# Patient Record
Sex: FEMALE | URBAN - METROPOLITAN AREA
[De-identification: names, ages, dates, MRNs, and addresses within clinical notes are randomized per-mention and may not be internally consistent; named-entity substitution may affect disease eponyms.]

---

## 2019-01-17 ENCOUNTER — APPOINTMENT (RX ONLY)
Dept: URBAN - METROPOLITAN AREA CLINIC 4 | Facility: CLINIC | Age: 69
Setting detail: DERMATOLOGY
End: 2019-01-17

## 2019-01-17 DIAGNOSIS — L82.1 OTHER SEBORRHEIC KERATOSIS: ICD-10-CM

## 2019-01-17 DIAGNOSIS — L98.8 OTHER SPECIFIED DISORDERS OF THE SKIN AND SUBCUTANEOUS TISSUE: ICD-10-CM

## 2019-01-17 DIAGNOSIS — L57.0 ACTINIC KERATOSIS: ICD-10-CM

## 2019-01-17 DIAGNOSIS — D22 MELANOCYTIC NEVI: ICD-10-CM

## 2019-01-17 DIAGNOSIS — L82.0 INFLAMED SEBORRHEIC KERATOSIS: ICD-10-CM

## 2019-01-17 DIAGNOSIS — D18.0 HEMANGIOMA: ICD-10-CM

## 2019-01-17 DIAGNOSIS — L81.4 OTHER MELANIN HYPERPIGMENTATION: ICD-10-CM

## 2019-01-17 PROBLEM — D22.5 MELANOCYTIC NEVI OF TRUNK: Status: ACTIVE | Noted: 2019-01-17

## 2019-01-17 PROBLEM — D18.01 HEMANGIOMA OF SKIN AND SUBCUTANEOUS TISSUE: Status: ACTIVE | Noted: 2019-01-17

## 2019-01-17 PROCEDURE — 99213 OFFICE O/P EST LOW 20 MIN: CPT | Mod: 25

## 2019-01-17 PROCEDURE — ? LIQUID NITROGEN

## 2019-01-17 PROCEDURE — ? COUNSELING

## 2019-01-17 PROCEDURE — 17110 DESTRUCTION B9 LES UP TO 14: CPT

## 2019-01-17 PROCEDURE — 17000 DESTRUCT PREMALG LESION: CPT | Mod: 59

## 2019-01-17 ASSESSMENT — LOCATION SIMPLE DESCRIPTION DERM
LOCATION SIMPLE: LEFT SHOULDER
LOCATION SIMPLE: LEFT LIP
LOCATION SIMPLE: ABDOMEN
LOCATION SIMPLE: RIGHT HAND
LOCATION SIMPLE: LEFT HAND
LOCATION SIMPLE: GROIN
LOCATION SIMPLE: RIGHT SHOULDER
LOCATION SIMPLE: RIGHT UPPER BACK
LOCATION SIMPLE: UPPER BACK

## 2019-01-17 ASSESSMENT — LOCATION ZONE DERM
LOCATION ZONE: ARM
LOCATION ZONE: LIP
LOCATION ZONE: TRUNK
LOCATION ZONE: HAND

## 2019-01-17 ASSESSMENT — LOCATION DETAILED DESCRIPTION DERM
LOCATION DETAILED: LEFT INFERIOR VERMILION LIP
LOCATION DETAILED: RIGHT ANTERIOR SHOULDER
LOCATION DETAILED: EPIGASTRIC SKIN
LOCATION DETAILED: RIGHT ULNAR DORSAL HAND
LOCATION DETAILED: RIGHT INFERIOR MEDIAL UPPER BACK
LOCATION DETAILED: RIGHT POSTERIOR SHOULDER
LOCATION DETAILED: INFERIOR THORACIC SPINE
LOCATION DETAILED: RIGHT RADIAL DORSAL HAND
LOCATION DETAILED: LEFT RADIAL DORSAL HAND
LOCATION DETAILED: RIGHT SUPRAPUBIC SKIN
LOCATION DETAILED: LEFT POSTERIOR SHOULDER

## 2019-01-17 NOTE — PROCEDURE: LIQUID NITROGEN
Duration Of Freeze Thaw-Cycle (Seconds): 0
Render Post-Care Instructions In Note?: no
Consent: The patient's consent was obtained including but not limited to risks of crusting, scabbing, blistering, scarring, darker or lighter pigmentary change, recurrence, incomplete removal and infection.
Post-Care Instructions: I reviewed with the patient in detail post-care instructions. Patient is to wear sunprotection, and avoid picking at any of the treated lesions. Pt may apply Vaseline to crusted or scabbing areas.
Detail Level: Detailed
Medical Necessity Clause: This procedure was medically necessary because the lesions that were treated were:
Medical Necessity Information: It is in your best interest to select a reason for this procedure from the list below. All of these items fulfill various CMS LCD requirements except the new and changing color options.

## 2019-01-17 NOTE — PROCEDURE: COUNSELING
Patients  came in to get samples.   
Detail Level: Generalized
Detail Level: Zone
Detail Level: Detailed

## 2019-04-04 ENCOUNTER — APPOINTMENT (RX ONLY)
Dept: URBAN - METROPOLITAN AREA CLINIC 4 | Facility: CLINIC | Age: 69
Setting detail: DERMATOLOGY
End: 2019-04-04

## 2019-04-04 DIAGNOSIS — T07XXXA ABRASION OR FRICTION BURN OF OTHER, MULTIPLE, AND UNSPECIFIED SITES, WITHOUT MENTION OF INFECTION: ICD-10-CM

## 2019-04-04 DIAGNOSIS — L82.0 INFLAMED SEBORRHEIC KERATOSIS: ICD-10-CM

## 2019-04-04 PROBLEM — S00.402A UNSPECIFIED SUPERFICIAL INJURY OF LEFT EAR, INITIAL ENCOUNTER: Status: ACTIVE | Noted: 2019-04-04

## 2019-04-04 PROCEDURE — 99212 OFFICE O/P EST SF 10 MIN: CPT | Mod: 25

## 2019-04-04 PROCEDURE — ? LIQUID NITROGEN

## 2019-04-04 PROCEDURE — ? DIAGNOSIS COMMENT

## 2019-04-04 PROCEDURE — ? COUNSELING

## 2019-04-04 PROCEDURE — 17110 DESTRUCTION B9 LES UP TO 14: CPT

## 2019-04-04 ASSESSMENT — LOCATION DETAILED DESCRIPTION DERM
LOCATION DETAILED: RIGHT RADIAL DORSAL HAND
LOCATION DETAILED: LEFT ANTITRAGUS

## 2019-04-04 ASSESSMENT — LOCATION SIMPLE DESCRIPTION DERM
LOCATION SIMPLE: LEFT EAR
LOCATION SIMPLE: RIGHT HAND

## 2019-04-04 ASSESSMENT — LOCATION ZONE DERM
LOCATION ZONE: HAND
LOCATION ZONE: EAR

## 2020-01-29 ENCOUNTER — APPOINTMENT (RX ONLY)
Dept: URBAN - METROPOLITAN AREA CLINIC 4 | Facility: CLINIC | Age: 70
Setting detail: DERMATOLOGY
End: 2020-01-29

## 2020-01-29 DIAGNOSIS — L82.1 OTHER SEBORRHEIC KERATOSIS: ICD-10-CM

## 2020-01-29 DIAGNOSIS — D18.0 HEMANGIOMA: ICD-10-CM

## 2020-01-29 DIAGNOSIS — D22 MELANOCYTIC NEVI: ICD-10-CM

## 2020-01-29 DIAGNOSIS — L81.4 OTHER MELANIN HYPERPIGMENTATION: ICD-10-CM

## 2020-01-29 PROBLEM — D18.01 HEMANGIOMA OF SKIN AND SUBCUTANEOUS TISSUE: Status: ACTIVE | Noted: 2020-01-29

## 2020-01-29 PROBLEM — D22.5 MELANOCYTIC NEVI OF TRUNK: Status: ACTIVE | Noted: 2020-01-29

## 2020-01-29 PROCEDURE — 99213 OFFICE O/P EST LOW 20 MIN: CPT

## 2020-01-29 PROCEDURE — ? COUNSELING

## 2020-01-29 ASSESSMENT — LOCATION DETAILED DESCRIPTION DERM
LOCATION DETAILED: RIGHT ULNAR DORSAL HAND
LOCATION DETAILED: LEFT RADIAL DORSAL HAND
LOCATION DETAILED: INFERIOR THORACIC SPINE
LOCATION DETAILED: RIGHT INFERIOR MEDIAL UPPER BACK
LOCATION DETAILED: RIGHT POSTERIOR SHOULDER
LOCATION DETAILED: RIGHT RADIAL DORSAL HAND
LOCATION DETAILED: LEFT POSTERIOR SHOULDER

## 2020-01-29 ASSESSMENT — LOCATION SIMPLE DESCRIPTION DERM
LOCATION SIMPLE: UPPER BACK
LOCATION SIMPLE: LEFT HAND
LOCATION SIMPLE: RIGHT HAND
LOCATION SIMPLE: RIGHT UPPER BACK
LOCATION SIMPLE: RIGHT SHOULDER
LOCATION SIMPLE: LEFT SHOULDER

## 2020-01-29 ASSESSMENT — LOCATION ZONE DERM
LOCATION ZONE: ARM
LOCATION ZONE: TRUNK
LOCATION ZONE: HAND

## 2020-08-24 ENCOUNTER — APPOINTMENT (RX ONLY)
Dept: URBAN - METROPOLITAN AREA CLINIC 4 | Facility: CLINIC | Age: 70
Setting detail: DERMATOLOGY
End: 2020-08-24

## 2020-08-24 DIAGNOSIS — L60.3 NAIL DYSTROPHY: ICD-10-CM

## 2020-08-24 PROCEDURE — ? ORDER TESTS

## 2020-08-24 PROCEDURE — 99212 OFFICE O/P EST SF 10 MIN: CPT

## 2020-08-24 PROCEDURE — ? COUNSELING

## 2020-08-24 ASSESSMENT — LOCATION SIMPLE DESCRIPTION DERM: LOCATION SIMPLE: LEFT MIDDLE FINGERNAIL

## 2020-08-24 ASSESSMENT — LOCATION ZONE DERM: LOCATION ZONE: FINGERNAIL

## 2020-08-24 ASSESSMENT — LOCATION DETAILED DESCRIPTION DERM: LOCATION DETAILED: LEFT MIDDLE FINGERNAIL

## 2020-08-24 NOTE — PROCEDURE: ORDER TESTS
Bill For Surgical Tray: no
Billing Type: Third-Party Bill
Expected Date Of Service: 08/24/2020
Performing Laboratory: [id number]
Lab Facility: [id number]

## 2021-01-27 ENCOUNTER — APPOINTMENT (RX ONLY)
Dept: URBAN - METROPOLITAN AREA CLINIC 4 | Facility: CLINIC | Age: 71
Setting detail: DERMATOLOGY
End: 2021-01-27

## 2021-01-27 DIAGNOSIS — D18.0 HEMANGIOMA: ICD-10-CM

## 2021-01-27 DIAGNOSIS — L81.4 OTHER MELANIN HYPERPIGMENTATION: ICD-10-CM

## 2021-01-27 DIAGNOSIS — L82.1 OTHER SEBORRHEIC KERATOSIS: ICD-10-CM

## 2021-01-27 DIAGNOSIS — D22 MELANOCYTIC NEVI: ICD-10-CM

## 2021-01-27 PROBLEM — D18.01 HEMANGIOMA OF SKIN AND SUBCUTANEOUS TISSUE: Status: ACTIVE | Noted: 2021-01-27

## 2021-01-27 PROBLEM — D22.5 MELANOCYTIC NEVI OF TRUNK: Status: ACTIVE | Noted: 2021-01-27

## 2021-01-27 PROCEDURE — ? COUNSELING

## 2021-01-27 PROCEDURE — 99212 OFFICE O/P EST SF 10 MIN: CPT

## 2021-01-27 ASSESSMENT — LOCATION DETAILED DESCRIPTION DERM
LOCATION DETAILED: RIGHT RADIAL DORSAL HAND
LOCATION DETAILED: RIGHT ULNAR DORSAL HAND
LOCATION DETAILED: EPIGASTRIC SKIN
LOCATION DETAILED: LEFT POSTERIOR SHOULDER
LOCATION DETAILED: LEFT RADIAL DORSAL HAND
LOCATION DETAILED: RIGHT INFERIOR MEDIAL UPPER BACK
LOCATION DETAILED: RIGHT POSTERIOR SHOULDER
LOCATION DETAILED: INFERIOR THORACIC SPINE

## 2021-01-27 ASSESSMENT — LOCATION SIMPLE DESCRIPTION DERM
LOCATION SIMPLE: LEFT SHOULDER
LOCATION SIMPLE: ABDOMEN
LOCATION SIMPLE: LEFT HAND
LOCATION SIMPLE: RIGHT UPPER BACK
LOCATION SIMPLE: RIGHT SHOULDER
LOCATION SIMPLE: RIGHT HAND
LOCATION SIMPLE: UPPER BACK

## 2021-01-27 ASSESSMENT — LOCATION ZONE DERM
LOCATION ZONE: TRUNK
LOCATION ZONE: HAND
LOCATION ZONE: ARM

## 2022-01-27 ENCOUNTER — APPOINTMENT (RX ONLY)
Dept: URBAN - METROPOLITAN AREA CLINIC 4 | Facility: CLINIC | Age: 72
Setting detail: DERMATOLOGY
End: 2022-01-27

## 2022-01-27 DIAGNOSIS — D22 MELANOCYTIC NEVI: ICD-10-CM

## 2022-01-27 DIAGNOSIS — L82.1 OTHER SEBORRHEIC KERATOSIS: ICD-10-CM

## 2022-01-27 DIAGNOSIS — L81.4 OTHER MELANIN HYPERPIGMENTATION: ICD-10-CM

## 2022-01-27 DIAGNOSIS — D18.0 HEMANGIOMA: ICD-10-CM

## 2022-01-27 PROBLEM — D18.01 HEMANGIOMA OF SKIN AND SUBCUTANEOUS TISSUE: Status: ACTIVE | Noted: 2022-01-27

## 2022-01-27 PROBLEM — D22.5 MELANOCYTIC NEVI OF TRUNK: Status: ACTIVE | Noted: 2022-01-27

## 2022-01-27 PROCEDURE — 99212 OFFICE O/P EST SF 10 MIN: CPT

## 2022-01-27 PROCEDURE — ? COUNSELING

## 2022-01-27 ASSESSMENT — LOCATION DETAILED DESCRIPTION DERM
LOCATION DETAILED: LEFT POSTERIOR SHOULDER
LOCATION DETAILED: RIGHT ULNAR DORSAL HAND
LOCATION DETAILED: EPIGASTRIC SKIN
LOCATION DETAILED: RIGHT RADIAL DORSAL HAND
LOCATION DETAILED: LEFT RADIAL DORSAL HAND
LOCATION DETAILED: INFERIOR THORACIC SPINE
LOCATION DETAILED: RIGHT POSTERIOR SHOULDER
LOCATION DETAILED: RIGHT INFERIOR MEDIAL UPPER BACK

## 2022-01-27 ASSESSMENT — LOCATION SIMPLE DESCRIPTION DERM
LOCATION SIMPLE: RIGHT UPPER BACK
LOCATION SIMPLE: LEFT HAND
LOCATION SIMPLE: RIGHT HAND
LOCATION SIMPLE: UPPER BACK
LOCATION SIMPLE: RIGHT SHOULDER
LOCATION SIMPLE: ABDOMEN
LOCATION SIMPLE: LEFT SHOULDER

## 2022-01-27 ASSESSMENT — LOCATION ZONE DERM
LOCATION ZONE: ARM
LOCATION ZONE: TRUNK
LOCATION ZONE: HAND

## 2023-02-09 ENCOUNTER — APPOINTMENT (RX ONLY)
Dept: URBAN - METROPOLITAN AREA CLINIC 4 | Facility: CLINIC | Age: 73
Setting detail: DERMATOLOGY
End: 2023-02-09

## 2023-02-09 DIAGNOSIS — L71.8 OTHER ROSACEA: ICD-10-CM

## 2023-02-09 DIAGNOSIS — L81.4 OTHER MELANIN HYPERPIGMENTATION: ICD-10-CM

## 2023-02-09 DIAGNOSIS — D22 MELANOCYTIC NEVI: ICD-10-CM

## 2023-02-09 DIAGNOSIS — D18.0 HEMANGIOMA: ICD-10-CM

## 2023-02-09 DIAGNOSIS — L82.1 OTHER SEBORRHEIC KERATOSIS: ICD-10-CM

## 2023-02-09 PROBLEM — D22.5 MELANOCYTIC NEVI OF TRUNK: Status: ACTIVE | Noted: 2023-02-09

## 2023-02-09 PROBLEM — D18.01 HEMANGIOMA OF SKIN AND SUBCUTANEOUS TISSUE: Status: ACTIVE | Noted: 2023-02-09

## 2023-02-09 PROCEDURE — 99213 OFFICE O/P EST LOW 20 MIN: CPT

## 2023-02-09 PROCEDURE — ? COUNSELING

## 2023-02-09 ASSESSMENT — LOCATION SIMPLE DESCRIPTION DERM
LOCATION SIMPLE: RIGHT UPPER BACK
LOCATION SIMPLE: RIGHT CHEEK
LOCATION SIMPLE: GROIN
LOCATION SIMPLE: ABDOMEN
LOCATION SIMPLE: RIGHT HAND
LOCATION SIMPLE: LEFT HAND
LOCATION SIMPLE: LEFT CHEEK
LOCATION SIMPLE: RIGHT SHOULDER
LOCATION SIMPLE: LEFT SHOULDER
LOCATION SIMPLE: UPPER BACK

## 2023-02-09 ASSESSMENT — LOCATION DETAILED DESCRIPTION DERM
LOCATION DETAILED: RIGHT RADIAL DORSAL HAND
LOCATION DETAILED: RIGHT INFERIOR MEDIAL UPPER BACK
LOCATION DETAILED: RIGHT POSTERIOR SHOULDER
LOCATION DETAILED: EPIGASTRIC SKIN
LOCATION DETAILED: LEFT RADIAL DORSAL HAND
LOCATION DETAILED: MONS PUBIS
LOCATION DETAILED: RIGHT ULNAR DORSAL HAND
LOCATION DETAILED: LEFT CENTRAL MALAR CHEEK
LOCATION DETAILED: INFERIOR THORACIC SPINE
LOCATION DETAILED: RIGHT INFERIOR CENTRAL MALAR CHEEK
LOCATION DETAILED: LEFT POSTERIOR SHOULDER

## 2023-02-09 ASSESSMENT — LOCATION ZONE DERM
LOCATION ZONE: FACE
LOCATION ZONE: HAND
LOCATION ZONE: ARM
LOCATION ZONE: TRUNK
LOCATION ZONE: VULVA

## 2023-02-09 ASSESSMENT — SEVERITY ASSESSMENT OVERALL AMONG ALL PATIENTS: IN YOUR EXPERIENCE, AMONG ALL PATIENTS YOU HAVE SEEN WITH THIS CONDITION, HOW SEVERE IS THIS PATIENT'S CONDITION?: MILD

## 2023-02-09 NOTE — HPI: SKIN LESION
What Type Of Note Output Would You Prefer (Optional)?: Bullet Format
Has Your Skin Lesion Been Treated?: been treated
Is This A New Presentation, Or A Follow-Up?: Growth
When Was It Treated?: 01/2019

## 2024-02-08 ENCOUNTER — APPOINTMENT (RX ONLY)
Dept: URBAN - METROPOLITAN AREA CLINIC 4 | Facility: CLINIC | Age: 74
Setting detail: DERMATOLOGY
End: 2024-02-08

## 2024-02-08 DIAGNOSIS — D18.0 HEMANGIOMA: ICD-10-CM

## 2024-02-08 DIAGNOSIS — L71.8 OTHER ROSACEA: ICD-10-CM

## 2024-02-08 DIAGNOSIS — L82.1 OTHER SEBORRHEIC KERATOSIS: ICD-10-CM

## 2024-02-08 DIAGNOSIS — L81.4 OTHER MELANIN HYPERPIGMENTATION: ICD-10-CM

## 2024-02-08 DIAGNOSIS — D22 MELANOCYTIC NEVI: ICD-10-CM

## 2024-02-08 PROBLEM — D22.5 MELANOCYTIC NEVI OF TRUNK: Status: ACTIVE | Noted: 2024-02-08

## 2024-02-08 PROBLEM — D18.01 HEMANGIOMA OF SKIN AND SUBCUTANEOUS TISSUE: Status: ACTIVE | Noted: 2024-02-08

## 2024-02-08 PROCEDURE — 99213 OFFICE O/P EST LOW 20 MIN: CPT

## 2024-02-08 PROCEDURE — ? COUNSELING

## 2024-02-08 ASSESSMENT — LOCATION DETAILED DESCRIPTION DERM
LOCATION DETAILED: LEFT RADIAL DORSAL HAND
LOCATION DETAILED: EPIGASTRIC SKIN
LOCATION DETAILED: LEFT CENTRAL MALAR CHEEK
LOCATION DETAILED: RIGHT INFERIOR CENTRAL MALAR CHEEK
LOCATION DETAILED: INFERIOR THORACIC SPINE
LOCATION DETAILED: RIGHT POSTERIOR SHOULDER
LOCATION DETAILED: LEFT POSTERIOR SHOULDER
LOCATION DETAILED: RIGHT ULNAR DORSAL HAND
LOCATION DETAILED: RIGHT RADIAL DORSAL HAND
LOCATION DETAILED: MONS PUBIS
LOCATION DETAILED: RIGHT INFERIOR MEDIAL UPPER BACK

## 2024-02-08 ASSESSMENT — LOCATION SIMPLE DESCRIPTION DERM
LOCATION SIMPLE: RIGHT SHOULDER
LOCATION SIMPLE: LEFT SHOULDER
LOCATION SIMPLE: LEFT HAND
LOCATION SIMPLE: RIGHT UPPER BACK
LOCATION SIMPLE: LEFT CHEEK
LOCATION SIMPLE: GROIN
LOCATION SIMPLE: RIGHT HAND
LOCATION SIMPLE: RIGHT CHEEK
LOCATION SIMPLE: ABDOMEN
LOCATION SIMPLE: UPPER BACK

## 2024-02-08 ASSESSMENT — LOCATION ZONE DERM
LOCATION ZONE: TRUNK
LOCATION ZONE: VULVA
LOCATION ZONE: FACE
LOCATION ZONE: HAND
LOCATION ZONE: ARM

## 2025-02-07 ENCOUNTER — APPOINTMENT (OUTPATIENT)
Dept: URBAN - METROPOLITAN AREA CLINIC 4 | Facility: CLINIC | Age: 75
Setting detail: DERMATOLOGY
End: 2025-02-07

## 2025-02-07 DIAGNOSIS — L71.8 OTHER ROSACEA: ICD-10-CM

## 2025-02-07 DIAGNOSIS — L81.4 OTHER MELANIN HYPERPIGMENTATION: ICD-10-CM

## 2025-02-07 DIAGNOSIS — D22 MELANOCYTIC NEVI: ICD-10-CM

## 2025-02-07 DIAGNOSIS — L98.8 OTHER SPECIFIED DISORDERS OF THE SKIN AND SUBCUTANEOUS TISSUE: ICD-10-CM

## 2025-02-07 DIAGNOSIS — L82.1 OTHER SEBORRHEIC KERATOSIS: ICD-10-CM

## 2025-02-07 DIAGNOSIS — D18.0 HEMANGIOMA: ICD-10-CM

## 2025-02-07 PROBLEM — D18.01 HEMANGIOMA OF SKIN AND SUBCUTANEOUS TISSUE: Status: ACTIVE | Noted: 2025-02-07

## 2025-02-07 PROBLEM — D22.5 MELANOCYTIC NEVI OF TRUNK: Status: ACTIVE | Noted: 2025-02-07

## 2025-02-07 PROCEDURE — 99213 OFFICE O/P EST LOW 20 MIN: CPT

## 2025-02-07 PROCEDURE — ? COUNSELING

## 2025-02-07 ASSESSMENT — LOCATION SIMPLE DESCRIPTION DERM
LOCATION SIMPLE: RIGHT UPPER BACK
LOCATION SIMPLE: RIGHT CHEEK
LOCATION SIMPLE: ABDOMEN
LOCATION SIMPLE: RIGHT SHOULDER
LOCATION SIMPLE: LEFT LIP
LOCATION SIMPLE: UPPER BACK
LOCATION SIMPLE: GROIN
LOCATION SIMPLE: RIGHT HAND
LOCATION SIMPLE: LEFT SHOULDER
LOCATION SIMPLE: LEFT HAND
LOCATION SIMPLE: LEFT CHEEK

## 2025-02-07 ASSESSMENT — LOCATION DETAILED DESCRIPTION DERM
LOCATION DETAILED: RIGHT POSTERIOR SHOULDER
LOCATION DETAILED: LEFT POSTERIOR SHOULDER
LOCATION DETAILED: RIGHT ULNAR DORSAL HAND
LOCATION DETAILED: LEFT RADIAL DORSAL HAND
LOCATION DETAILED: EPIGASTRIC SKIN
LOCATION DETAILED: RIGHT INFERIOR MEDIAL UPPER BACK
LOCATION DETAILED: RIGHT INFERIOR CENTRAL MALAR CHEEK
LOCATION DETAILED: LEFT INFERIOR VERMILION LIP
LOCATION DETAILED: INFERIOR THORACIC SPINE
LOCATION DETAILED: RIGHT RADIAL DORSAL HAND
LOCATION DETAILED: MONS PUBIS
LOCATION DETAILED: LEFT CENTRAL MALAR CHEEK

## 2025-02-07 ASSESSMENT — LOCATION ZONE DERM
LOCATION ZONE: HAND
LOCATION ZONE: LIP
LOCATION ZONE: ARM
LOCATION ZONE: VULVA
LOCATION ZONE: TRUNK
LOCATION ZONE: FACE

## 2025-02-18 ENCOUNTER — APPOINTMENT (OUTPATIENT)
Dept: URBAN - METROPOLITAN AREA CLINIC 4 | Facility: CLINIC | Age: 75
Setting detail: DERMATOLOGY
End: 2025-02-18

## 2025-02-18 DIAGNOSIS — L81.4 OTHER MELANIN HYPERPIGMENTATION: ICD-10-CM

## 2025-02-18 PROCEDURE — ? ADDITIONAL NOTES

## 2025-02-18 NOTE — PROCEDURE: ADDITIONAL NOTES
Additional Notes: Pt is interested in Full cheek tx est. ~$380/tx. \\nPt aware of test site process and $55 fee. 3 to 4 treatments are estimated.\\nPt given education sheet. Consents signed. Pt reminded to hold exfoliants one week before and after tx.\\nMI (16). \\n\\nPt has appt Thurs for Botox at a different office. Pt would be more comfortable rescheduling test site day and full treatment for 1-2 weeks after. \\nWill charge consult fee when test sites are completed.
Detail Level: Simple
Render Risk Assessment In Note?: no

## 2025-02-18 NOTE — HPI: COSMETIC (IPL)
Eye Color: brown
Have You Had Laser Removal Of Brown Spots Before?: has not had previous treatment
When Outside In The Sun, Do You...: always burns, never tans
Additional History: 24 years ago pt had laser tx. Pt numbed before. Likely an ablative tx. Pt cannot recall type of laser.\\n\\nPt has a Botox appt scheduled at a different office on 2/20/25.

## 2025-03-06 ENCOUNTER — APPOINTMENT (OUTPATIENT)
Dept: URBAN - METROPOLITAN AREA CLINIC 4 | Facility: CLINIC | Age: 75
Setting detail: DERMATOLOGY
End: 2025-03-06

## 2025-03-06 DIAGNOSIS — L71.8 OTHER ROSACEA: ICD-10-CM

## 2025-03-06 PROCEDURE — ? ADDITIONAL NOTES

## 2025-03-06 PROCEDURE — ? IPL COSMETIC

## 2025-03-06 ASSESSMENT — LOCATION DETAILED DESCRIPTION DERM
LOCATION DETAILED: RIGHT INFERIOR PREAURICULAR CHEEK
LOCATION DETAILED: RIGHT INFERIOR MEDIAL MALAR CHEEK
LOCATION DETAILED: RIGHT INFERIOR CENTRAL MALAR CHEEK
LOCATION DETAILED: LEFT SUPERIOR MEDIAL MALAR CHEEK
LOCATION DETAILED: RIGHT MEDIAL MALAR CHEEK
LOCATION DETAILED: LEFT INFERIOR PREAURICULAR CHEEK
LOCATION DETAILED: LEFT INFERIOR MEDIAL MALAR CHEEK
LOCATION DETAILED: RIGHT LATERAL ZYGOMA
LOCATION DETAILED: LEFT LATERAL ZYGOMA

## 2025-03-06 ASSESSMENT — LOCATION ZONE DERM: LOCATION ZONE: FACE

## 2025-03-06 ASSESSMENT — LOCATION SIMPLE DESCRIPTION DERM
LOCATION SIMPLE: RIGHT CHEEK
LOCATION SIMPLE: LEFT CHEEK
LOCATION SIMPLE: RIGHT ZYGOMA
LOCATION SIMPLE: LEFT ZYGOMA

## 2025-03-06 NOTE — PROCEDURE: IPL COSMETIC
Pulse Duration (Include Units): right: 15; left: 10
Detail Level: Zone
Pre-Procedure Care: Appropriate protective eyewear on all present. Door closed with laser warning sign displayed. Education sheet given. Photos taken. \\npt will require around 3 treatments. Measured MI ( 16)  and reviewed suggested skintel ranges to guide test spot dosages. Reviewed risk, benefits, outcome, pricing, and side effects with pt. Will notify provider for extensive erythema/edema, blistering, crusting, dyspigmentation, or other pt concerns.
Fluence (Include Units): right: 36; left: 34
Add Additional Location: No
Treatment Number: Test Spots
Add Additional Location: Yes
cooling tip, roller, wrapped ice
Price (Use Numbers Only, No Special Characters Or $): 55
Clinical Endpoint: MI-16
Pulse Duration (Include Units): Right: 34, 38; Left: 32, 34ms
Indication: redness
Eye Protection: opaque goggles
Location (Required For Details To Render): TGA central cheek
Ipl Type: maxg
End-Point And Post-Procedure Care: Clinical End Points: minimal erythema, temporary darkening of vessels seen. Pt tolerated well.\\nImmediately following the procedure ice applied. Post care and education sheet reviewed with patient. Reminded pt to practice sun safety precautions throughout tx course.
Number Of Passes: 4
Consent: Risks reviewed including but not limited to crusting, scabbing, blistering, scarring, darker or lighter pigmentary change, incidental hair removal, bruising, and/or incomplete removal. Obtained laser and cosmetic consents.
Fluence (Include Units): Right:  34, 38; Left: 32, 34J/cm2

## 2025-03-06 NOTE — PROCEDURE: ADDITIONAL NOTES
Detail Level: Simple
Render Risk Assessment In Note?: no
Additional Notes: Treatment Plan:\\npatient will require __3 to _4treatments.\\n\\nSettings start at test site settings and will be adjusted per protocol in ICON manual, depending upon patient compliance and clinical response.\\n\\nAsk pt about sun exposure history- recheck Skintel if needed or suspected sun exposure.\\n\\nNotify supervising provider for extensive erythema, blistering, crusting, purpura, extensive edema, dyspigmentation, pain, or other patient concerns.\\n\\nRisk, benefits, outcome, side effects, and cost all discussed with patient.

## 2025-03-13 ENCOUNTER — APPOINTMENT (OUTPATIENT)
Dept: URBAN - METROPOLITAN AREA CLINIC 4 | Facility: CLINIC | Age: 75
Setting detail: DERMATOLOGY
End: 2025-03-13

## 2025-03-13 DIAGNOSIS — L71.8 OTHER ROSACEA: ICD-10-CM

## 2025-03-13 PROCEDURE — ? IPL COSMETIC

## 2025-03-13 PROCEDURE — ? ADDITIONAL NOTES

## 2025-03-13 ASSESSMENT — LOCATION SIMPLE DESCRIPTION DERM
LOCATION SIMPLE: LEFT CHEEK
LOCATION SIMPLE: LEFT ZYGOMA
LOCATION SIMPLE: RIGHT ZYGOMA
LOCATION SIMPLE: RIGHT CHEEK

## 2025-03-13 ASSESSMENT — LOCATION DETAILED DESCRIPTION DERM
LOCATION DETAILED: RIGHT LATERAL ZYGOMA
LOCATION DETAILED: LEFT INFERIOR PREAURICULAR CHEEK
LOCATION DETAILED: LEFT SUPERIOR MEDIAL MALAR CHEEK
LOCATION DETAILED: RIGHT INFERIOR MEDIAL MALAR CHEEK
LOCATION DETAILED: RIGHT INFERIOR CENTRAL MALAR CHEEK
LOCATION DETAILED: LEFT INFERIOR MEDIAL MALAR CHEEK
LOCATION DETAILED: RIGHT INFERIOR PREAURICULAR CHEEK
LOCATION DETAILED: RIGHT MEDIAL MALAR CHEEK
LOCATION DETAILED: LEFT LATERAL ZYGOMA

## 2025-03-13 ASSESSMENT — LOCATION ZONE DERM: LOCATION ZONE: FACE

## 2025-03-13 NOTE — PROCEDURE: ADDITIONAL NOTES
Detail Level: Simple
Render Risk Assessment In Note?: no
Additional Notes: Treatment Plan:\\npatient will require __3 to _4treatments. Full cheek est. ~$380/tx.\\n\\nSettings start at test site settings and will be adjusted per protocol in ICON manual, depending upon patient compliance and clinical response.\\n\\nAsk pt about sun exposure history- recheck Skintel if needed or suspected sun exposure.\\n\\nNotify supervising provider for extensive erythema, blistering, crusting, purpura, extensive edema, dyspigmentation, pain, or other patient concerns.\\n\\nRisk, benefits, outcome, side effects, and cost all discussed with patient.

## 2025-03-13 NOTE — PROCEDURE: IPL COSMETIC
Pulse Duration (Include Units): right: 15; left: 10
Detail Level: Zone
Pre-Procedure Care: Appropriate protective eyewear on all present. Door closed with laser warning sign displayed.
Fluence (Include Units): right: 36; left: 34
Add Additional Location: No
Treatment Number: 1
cooling tip, roller, wrapped ice
Price (Use Numbers Only, No Special Characters Or $): 380
Pulse Duration (Include Units): 15 ms
Indication: redness
Eye Protection: opaque goggles
Total Pulses (Location 1): 112
Location (Required For Details To Render): TGA central cheek
Ipl Type: maxg
End-Point And Post-Procedure Care: Clinical End Points: minimal to moderate erythema, temporary darkening and color change to blue/purple of vessels seen. Pt tolerated well.\\nImmediately following the procedure ice applied. Post care and education sheet reviewed with patient. Reminded pt to practice sun safety precautions throughout tx course.\\n\\nPt given sample of EltaMD daily
Show Price In Visit Note: Yes
Consent: Risks reviewed including but not limited to crusting, scabbing, blistering, scarring, darker or lighter pigmentary change, incidental hair removal, bruising, and/or incomplete removal.
Fluence (Include Units): 36 J/cm2

## 2025-04-03 ENCOUNTER — APPOINTMENT (OUTPATIENT)
Dept: URBAN - METROPOLITAN AREA CLINIC 4 | Facility: CLINIC | Age: 75
Setting detail: DERMATOLOGY
End: 2025-04-03

## 2025-04-03 DIAGNOSIS — L71.8 OTHER ROSACEA: ICD-10-CM

## 2025-04-03 PROCEDURE — ? LUCENT IPL

## 2025-04-03 PROCEDURE — ? ADDITIONAL NOTES

## 2025-04-03 ASSESSMENT — LOCATION SIMPLE DESCRIPTION DERM: LOCATION SIMPLE: RIGHT CHEEK

## 2025-04-03 ASSESSMENT — LOCATION ZONE DERM: LOCATION ZONE: FACE

## 2025-04-03 ASSESSMENT — LOCATION DETAILED DESCRIPTION DERM: LOCATION DETAILED: RIGHT CENTRAL MALAR CHEEK

## 2025-04-03 NOTE — PROCEDURE: ADDITIONAL NOTES
Detail Level: Simple
Render Risk Assessment In Note?: no
Additional Notes: Treatment Plan: \\nThe patient is interested in IPL tx for ( rosacea/redness ) on the (Full cheek).  ( 2-3 )  tx’s are recommended, spaced ( 3-4 ) week(s) apart with ( yearly ) maintenance. Current estimated cost is $ ( 380) /tx.  Pt's skin type is ( II ).\\n\\nThe current tx plan uses ( 585 ) nm and ( 560 ) nm filter, starting at the above test site settings and will be adjusted as the pt tolerates or until good clinical end point are achieved. Any further future adjustment in settings would require additional test pulses performed at each 1-2 J/cm2 fluence adjustment, 1-2 ms pulse width adjustment, or one pulse type adjustment.  \\n\\nFindings reviewed with ( Dr. Meléndez).  Additional considerations from provider:   \\n          Notify the provider of any potential changes to the Tx plan, adverse reaction, or patient concern. Including, but not limited to, extending the expected tx numbers beyond ( 5 ) tx’s, changing the filters used, or adjusting settings beyond the incremental test pulse process described above.

## 2025-04-03 NOTE — PROCEDURE: LUCENT IPL
Fluence (J/Cm2): 16
Location Override: TGA
Location Override: T/S - L cheek
Wavelength: 430nm
Add Another Setting?: no
Total Pulses: 5
Consent: IPL consent obtained and uploaded. Risks reviewed and AQA.
Wavelength: 585nm
Mode: Skin Treatment Mode
Spot Size: 8mm
Price (Use Numbers Only, No Special Characters Or $): 0
Add Another Setting?: yes
Pulse Duration (Ms): 13ms (w/ 10ms delay)
Type Of Pulse: Double
Procedure Note: Clinical End Points:\\n585nm:  Pt tolerated 16J well on lateral face. Central cheek, pt reported stronger discomfort, 6/10. Consider 10/15J for central face and 10/16J for lateral cheeks- first pass. \\n560nm:  Pt reported strong 7/10 discomfort with 10ms (10delay) and 24J, decreased setting to 13ms, which pt tolerated well, reporting 5-6/10 discomfort.\\n\\nOverall, minimal to moderate erythema and no swelling seen. No changes in vessel color or size seen.
Pulse Duration (Ms): 10
Spot Size: 12mm x 12mm
Type Of Pulse: Single
Location Override: T/S - central cheeks
Fluence (J/Cm2): 24
Wavelength: 560nm
Indication Override: Rosacea
Skin Type: II
Comments: Initial settings; \\n       585nm:  ( single ) pulse, ( 10 ) ms, ( 12 ) J/cm2\\n       560nm:  ( double ) pulse, ( 12 w/ 20 Ms delay) ms,  (20 ) J/cm2 + square adapter\\nTest sites were performed starting at these initial settings and adjusted incrementally according to pt response and achievement of good clinical end points. A test pulse was performed at each step by either adjusting the fluence 1-2 J/cm2 or pulse width 1-2ms, or pulse type by one. Final settings are documented in the setting details and final clinical end points are listed in the procedure note. In this case, the pulse count measures the number of test pulses/settings to achieve the final settings listed in the setting details.
Detail Level: Zone
Post-Care Instructions: Pt given/has education sheet, I reviewed with the patient in detail post-care instructions. Pt reminded to practice sun safety.

## 2025-05-08 ENCOUNTER — APPOINTMENT (OUTPATIENT)
Dept: URBAN - METROPOLITAN AREA CLINIC 4 | Facility: CLINIC | Age: 75
Setting detail: DERMATOLOGY
End: 2025-05-08

## 2025-05-08 DIAGNOSIS — L71.8 OTHER ROSACEA: ICD-10-CM

## 2025-05-08 PROCEDURE — ? LUCENT IPL

## 2025-05-08 PROCEDURE — ? ADDITIONAL NOTES

## 2025-05-08 ASSESSMENT — LOCATION ZONE DERM: LOCATION ZONE: FACE

## 2025-05-08 ASSESSMENT — LOCATION DETAILED DESCRIPTION DERM
LOCATION DETAILED: RIGHT CENTRAL MALAR CHEEK
LOCATION DETAILED: LEFT INFERIOR CENTRAL MALAR CHEEK

## 2025-05-08 ASSESSMENT — LOCATION SIMPLE DESCRIPTION DERM
LOCATION SIMPLE: LEFT CHEEK
LOCATION SIMPLE: RIGHT CHEEK

## 2025-05-08 NOTE — PROCEDURE: ADDITIONAL NOTES
Detail Level: Simple
Render Risk Assessment In Note?: no
Additional Notes: Treatment Plan: \\nThe patient is interested in IPL tx for ( rosacea/redness ) on the (Full cheek).  ( 2-3 )  tx’s are recommended, spaced ( 3-4 ) week(s) apart with ( yearly ) maintenance. Current estimated cost is $ ( 380) /tx.  Pt's skin type is ( II ).\\n\\nThe current tx plan uses ( 585 ) nm and ( 560 ) nm filter, starting at the above test site settings and will be adjusted as the pt tolerates or until good clinical end point are achieved. Any further future adjustment in settings would require additional test pulses performed at each 1-2 J/cm2 fluence adjustment, 1-2 ms pulse width adjustment, or one pulse type adjustment.  \\n\\nAdditional considerations from provider:   \\n          Notify the provider of any potential changes to the Tx plan, adverse reaction, or patient concern. Including, but not limited to, changing the filters used, or adjusting settings beyond the incremental test pulse process described above.

## 2025-05-08 NOTE — PROCEDURE: LUCENT IPL
Fluence (J/Cm2): 16
Location Override: TGA
Location Override: full cheeks
Wavelength: 430nm
Add Another Setting?: no
Consent: Risks reviewed and AQA.
Wavelength: 585nm
Mode: Skin Treatment Mode
Spot Size: 8mm
Price (Use Numbers Only, No Special Characters Or $): 380
Add Another Setting?: yes
Treatment Number: 2
Pulse Duration (Ms): 13ms (w/ 10ms delay)
Number Of Passes: 1
Type Of Pulse: Double
Procedure Note: Clinical End Points:\\nModerate erythema and minimal swelling seen. Pt tolerated well. \\nWrapped ice applied. \\nGreen tinted EltaMD UV skin recovery applied. Pt is very happy with this product and would be interested in purchasing once available.
Pulse Duration (Ms): 10
Number Of Prepaid Treatments (Will Not Render If 0): 0
Spot Size: 12mm x 12mm
Type Of Pulse: Single
Location Override: central cheeks
Fluence (J/Cm2): 24, 22 (upper cheeks)
Wavelength: 560nm
Skin Type: II
Detail Level: Zone
Post-Care Instructions: Pt given/has education sheet, I reviewed with the patient in detail post-care instructions. Pt reminded to practice sun safety.

## 2025-07-28 ENCOUNTER — APPOINTMENT (OUTPATIENT)
Dept: URBAN - METROPOLITAN AREA CLINIC 4 | Facility: CLINIC | Age: 75
Setting detail: DERMATOLOGY
End: 2025-07-28

## 2025-07-28 DIAGNOSIS — L71.8 OTHER ROSACEA: ICD-10-CM | Status: IMPROVED

## 2025-07-28 PROCEDURE — ? ADDITIONAL NOTES

## 2025-07-28 PROCEDURE — ? LUCENT IPL

## 2025-07-28 ASSESSMENT — LOCATION DETAILED DESCRIPTION DERM
LOCATION DETAILED: LEFT INFERIOR CENTRAL MALAR CHEEK
LOCATION DETAILED: RIGHT CENTRAL MALAR CHEEK

## 2025-07-28 ASSESSMENT — LOCATION ZONE DERM: LOCATION ZONE: FACE

## 2025-07-28 ASSESSMENT — LOCATION SIMPLE DESCRIPTION DERM
LOCATION SIMPLE: LEFT CHEEK
LOCATION SIMPLE: RIGHT CHEEK